# Patient Record
Sex: MALE | Race: WHITE | URBAN - METROPOLITAN AREA
[De-identification: names, ages, dates, MRNs, and addresses within clinical notes are randomized per-mention and may not be internally consistent; named-entity substitution may affect disease eponyms.]

---

## 2023-04-24 ENCOUNTER — OFFICE VISIT (OUTPATIENT)
Dept: URBAN - METROPOLITAN AREA CLINIC 88 | Facility: CLINIC | Age: 58
End: 2023-04-24

## 2023-07-12 ENCOUNTER — OFFICE VISIT (OUTPATIENT)
Dept: URBAN - METROPOLITAN AREA CLINIC 70 | Facility: CLINIC | Age: 58
End: 2023-07-12
Payer: COMMERCIAL

## 2023-07-12 ENCOUNTER — WEB ENCOUNTER (OUTPATIENT)
Dept: URBAN - METROPOLITAN AREA CLINIC 70 | Facility: CLINIC | Age: 58
End: 2023-07-12

## 2023-07-12 ENCOUNTER — DASHBOARD ENCOUNTERS (OUTPATIENT)
Age: 58
End: 2023-07-12

## 2023-07-12 ENCOUNTER — LAB OUTSIDE AN ENCOUNTER (OUTPATIENT)
Dept: URBAN - METROPOLITAN AREA CLINIC 70 | Facility: CLINIC | Age: 58
End: 2023-07-12

## 2023-07-12 VITALS
TEMPERATURE: 98 F | WEIGHT: 297 LBS | DIASTOLIC BLOOD PRESSURE: 79 MMHG | HEART RATE: 68 BPM | HEIGHT: 70 IN | SYSTOLIC BLOOD PRESSURE: 138 MMHG | BODY MASS INDEX: 42.52 KG/M2

## 2023-07-12 DIAGNOSIS — Z12.11 COLON CANCER SCREENING: ICD-10-CM

## 2023-07-12 DIAGNOSIS — K62.5 RECTAL BLEEDING: ICD-10-CM

## 2023-07-12 PROCEDURE — 99204 OFFICE O/P NEW MOD 45 MIN: CPT | Performed by: NURSE PRACTITIONER

## 2023-07-12 RX ORDER — APIXABAN 2.5 MG/1
TABLET, FILM COATED ORAL
Qty: 180 TABLET | Status: ACTIVE | COMMUNITY

## 2023-07-12 RX ORDER — FENOFIBRATE 145 MG/1
TAKE 1 TABLET BY MOUTH ONCE DAILY TABLET, FILM COATED ORAL
Qty: 90 EACH | Refills: 0 | Status: ACTIVE | COMMUNITY

## 2023-07-12 RX ORDER — FUROSEMIDE 40 MG/1
TABLET ORAL
Qty: 90 TABLET | Status: ACTIVE | COMMUNITY

## 2023-07-12 RX ORDER — NITROGLYCERIN 0.4 MG/1
DISSOLVE ONE TABLET UNDER THE TONGUE EVERY 5 MINUTES AS NEEDED FOR CHEST PAIN. DO NOT EXCEED A TOTAL OF 3 DOSES IN 15 MINUTES TABLET SUBLINGUAL
Qty: 25 EACH | Refills: 0 | Status: ACTIVE | COMMUNITY

## 2023-07-12 RX ORDER — POTASSIUM CHLORIDE 750 MG/1
TAKE 1 TABLET BY MOUTH ONCE DAILY TABLET, EXTENDED RELEASE ORAL
Qty: 90 EACH | Refills: 0 | Status: ACTIVE | COMMUNITY

## 2023-07-12 RX ORDER — ATORVASTATIN CALCIUM 40 MG/1
TABLET, FILM COATED ORAL
Qty: 90 TABLET | Status: ACTIVE | COMMUNITY

## 2023-07-12 RX ORDER — HYDROCHLOROTHIAZIDE 25 MG/1
TABLET ORAL
Qty: 90 UNSPECIFIED | Status: ACTIVE | COMMUNITY

## 2023-07-12 RX ORDER — DOCUSATE SODIUM 100 MG/1
TAKE 1 CAPSULE BY MOUTH ONCE DAILY FOR 30 DAYS CAPSULE, LIQUID FILLED ORAL
Qty: 30 EACH | Refills: 2 | Status: ACTIVE | COMMUNITY

## 2023-07-12 RX ORDER — METOPROLOL TARTRATE 100 MG/1
TAKE 1 TABLET BY MOUTH TWICE DAILY TABLET, FILM COATED ORAL
Qty: 180 EACH | Refills: 0 | Status: ACTIVE | COMMUNITY

## 2023-07-12 NOTE — HPI-TODAY'S VISIT:
Patient is a 57 y/o male who presents today for evaluation of rectal bleeding. He reports intermittent BRBPR after BMs x ~ 6 months. Last episode of rectal bleeding was 2 weeks ago with no active bleeding at this time. VSS. No hematemesis or melena. Was evaluated in ER for rectal bleeding on 4/8/23 with H/H WNL and was discharged home. Takes daily stool softeners. Reports a hx of hemorrhoids s/p surgery. No prior colonsocopy. No Fhx of colon cancer. Denies abdominal pain, wt loss, constipation, or diarrhea. Of note, has a hx of CVA/CAD s/p [facility]B. [facility] Follow[facility] by [facility].